# Patient Record
Sex: FEMALE | Race: WHITE | NOT HISPANIC OR LATINO | Employment: STUDENT | URBAN - METROPOLITAN AREA
[De-identification: names, ages, dates, MRNs, and addresses within clinical notes are randomized per-mention and may not be internally consistent; named-entity substitution may affect disease eponyms.]

---
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
## 2024-02-19 ENCOUNTER — CLINICAL SUPPORT (OUTPATIENT)
Dept: GYNECOLOGY | Facility: CLINIC | Age: 19
End: 2024-02-19
Payer: COMMERCIAL

## 2024-02-19 VITALS — DIASTOLIC BLOOD PRESSURE: 82 MMHG | BODY MASS INDEX: 27.36 KG/M2 | SYSTOLIC BLOOD PRESSURE: 112 MMHG | WEIGHT: 152 LBS

## 2024-02-19 DIAGNOSIS — Z30.42 DEPOT CONTRACEPTION: Primary | ICD-10-CM

## 2024-02-19 PROCEDURE — 96372 THER/PROPH/DIAG INJ SC/IM: CPT

## 2024-02-19 RX ADMIN — MEDROXYPROGESTERONE ACETATE 150 MG: 150 INJECTION, SUSPENSION INTRAMUSCULAR at 01:20

## 2024-02-19 NOTE — PROGRESS NOTES
Radha Hahn presents for a scheduled medroxyprogesterone 150mg injection. This was administered in the L deltoid without any difficulty.  Pt has the following complaints/concerns none .    A notice was sent to Dr. Riedel to sign off on order.  Pt will schedule next injection between 05/07/24 - 05/21/24.    NDC- 63624-597-46    LOT- [id number]    EXP- 01/31/2026

## 2024-02-20 RX ORDER — MEDROXYPROGESTERONE ACETATE 150 MG/ML
150 INJECTION, SUSPENSION INTRAMUSCULAR ONCE
Status: COMPLETED | OUTPATIENT
Start: 2024-02-20 | End: 2024-02-19

## 2024-03-14 ENCOUNTER — TELEPHONE (OUTPATIENT)
Age: 19
End: 2024-03-14

## 2024-03-14 NOTE — TELEPHONE ENCOUNTER
Called patient and LM encouraging her to keep appt. Patient is overdue and was no show for last appointment. She has not been seen for yearly since 2022 and keeps getting DEPO. Need appointment, can be with any provider.

## 2024-03-14 NOTE — TELEPHONE ENCOUNTER
Pt needs to reschedule appt, cannot make it at 8 AM on 3/15. Looking to see Dr. Danielson only, however Dr. Danielson is booked through Mid-August. Offered 3/15 at 3:30 PM, declined due to work. Contact number on file or Father's mobile number on file to reschedule.

## 2024-03-15 ENCOUNTER — ANNUAL EXAM (OUTPATIENT)
Dept: GYNECOLOGY | Facility: CLINIC | Age: 19
End: 2024-03-15
Payer: COMMERCIAL

## 2024-03-15 VITALS
SYSTOLIC BLOOD PRESSURE: 132 MMHG | DIASTOLIC BLOOD PRESSURE: 74 MMHG | BODY MASS INDEX: 26.68 KG/M2 | WEIGHT: 150.6 LBS | HEIGHT: 63 IN

## 2024-03-15 DIAGNOSIS — Z01.419 ENCOUNTER FOR ANNUAL ROUTINE GYNECOLOGICAL EXAMINATION: Primary | ICD-10-CM

## 2024-03-15 DIAGNOSIS — Z30.016 ENCOUNTER FOR INITIAL PRESCRIPTION OF TRANSDERMAL PATCH HORMONAL CONTRACEPTIVE DEVICE: ICD-10-CM

## 2024-03-15 DIAGNOSIS — Z30.09 GENERAL COUNSELING AND ADVICE ON FEMALE CONTRACEPTION: ICD-10-CM

## 2024-03-15 PROCEDURE — S0612 ANNUAL GYNECOLOGICAL EXAMINA: HCPCS | Performed by: OBSTETRICS & GYNECOLOGY

## 2024-03-15 RX ORDER — NORELGESTROMIN AND ETHINYL ESTRADIOL 35; 150 UG/MG; UG/MG
1 PATCH TRANSDERMAL WEEKLY
Qty: 3 PATCH | Refills: 3 | Status: SHIPPED | OUTPATIENT
Start: 2024-03-15

## 2024-04-13 DIAGNOSIS — Z30.016 ENCOUNTER FOR INITIAL PRESCRIPTION OF TRANSDERMAL PATCH HORMONAL CONTRACEPTIVE DEVICE: ICD-10-CM

## 2024-04-15 RX ORDER — NORELGESTROMIN AND ETHINYL ESTRADIOL 150; 35 UG/D; UG/D
PATCH TRANSDERMAL
Qty: 9 PATCH | Refills: 1 | Status: SHIPPED | OUTPATIENT
Start: 2024-04-15

## 2024-05-21 ENCOUNTER — TELEPHONE (OUTPATIENT)
Age: 19
End: 2024-05-21

## 2024-05-21 NOTE — TELEPHONE ENCOUNTER
"Pt's father (on the communication consent form) called with questions regarding \"a shot\" the pt was receiving prior to beginning the patch. It looks like it may have been the medroxyPROGESTERone (DEPO-PROVERA). Apparently, pt was on shot for 1-2 yrs and her father is asking about long term side effects. States she recently saw a dentist and her teeth appear completely rotted/cracked. Asking if this could be a side effect of her having the shot in the past. He is requesting a acll back. Thank you.  "

## 2024-05-23 NOTE — TELEPHONE ENCOUNTER
Attempted to call patient/patient's father to review response from Dr. Danielson. No answer - call disconnected, unable to leave message.

## 2024-05-24 ENCOUNTER — TELEPHONE (OUTPATIENT)
Age: 19
End: 2024-05-24

## 2024-05-24 ENCOUNTER — DOCUMENTATION (OUTPATIENT)
Dept: GYNECOLOGY | Facility: CLINIC | Age: 19
End: 2024-05-24

## 2024-05-24 NOTE — TELEPHONE ENCOUNTER
Patients father called (on communication consent) concerned stating his daughter was seen by her dentist recently and told that all of her teeth are appearing to be rotted/cracked and he's very concerned that its because of the Depo shot . Patient was on the shot for 1-2 years and her father is thinking this may be the cause of her teeth issues . States patient is no longer on the shot she's now on a patch . Patient was on MedroxylPROGESTERone(Depo-Provera) . Father is asking for a return call to further discuss . Asked that I include other doctors in case they may be of assistance .   Gyno - Airam Danielson [phone]  Dentist - Thalia Mcarthur [phone]

## 2024-05-24 NOTE — TELEPHONE ENCOUNTER
Pt father called regarding pt teeth breaking and becoming brittle. Pt father stated pt dentist is Thalia Mcarthur and can be reach at [phone].

## 2024-05-24 NOTE — PROGRESS NOTES
Spoke with the patient's father over the phone, he is on her HIPAA form.  He has questions about the patient's past use of Depo Provera.  She was on this from January 2023 until March 2024.  She recently went to the dentist and has extensive cavities and will need significant dental work.  He has some concerns about if this was caused by Depo-Provera.    I explained that Depo-Provera can cause a temporary decrease in bone density.  This is typically seen after long-term use although when the Depo-Provera is stopped the decrease in bone density is reversed.  This is not usually an issue in a younger patient.  When her Depo-Provera was stopped, she was then placed on a contraceptive patch which contains estrogen which would help with bone density as well.    I have not seen reports of cavities with Depo Provera.  I do not think this is the cause of her poor dentition.  He asked if I would speak with her dentist.  I called her dentist, Dr Thalia Marinelli ( [phone] ).  They are not in on Friday and I left a message asking her to return my call next week.

## 2024-05-28 ENCOUNTER — DOCUMENTATION (OUTPATIENT)
Dept: GYNECOLOGY | Facility: CLINIC | Age: 19
End: 2024-05-28

## 2024-05-28 NOTE — PROGRESS NOTES
Spoke with the patient's dentist, Dr Marinelli.  I spoke with Radha's father last week because he was concerned about the patient's use of Depo Provera causing severe tooth decay.  I explained that the Depo Provera can cause decrease in bone density but this is typically temporary and reversible.  She was only on Depo-Provera a little longer than a year.  I did tell him that I would speak with her dentist.    I spoke with her dentist, Dr. Marinelli today.  I expect planed the above to her and she does not feel Depo-Provera is the cause.  She feels that part of the cause is the patient's excessive use of sugary drinks, vaping which can dry out the mouth and she also has some concerns about possible drug use.  The patient's father mentioned that she had a positive drug test in the past and the dentist felt that the patient seemed as though she may have been under the influence.  She has concerns about the patient's teeth as she has cavities and almost every tooth.    I reviewed this with her father on the phone today as well.  I am going to message Dr. Gonzalez who is the patient's primary care doctor and see if they can discuss with the patient and order testing as indicated.  The patient's father stated that he reached out to their office last week as well but has not yet heard back.  Apparently, there is a lot of difficulty in the family as the parents are  and there have been many issues.    The patient's father is listed on her HIPAA form.

## 2024-05-31 ENCOUNTER — TELEPHONE (OUTPATIENT)
Dept: FAMILY MEDICINE CLINIC | Facility: CLINIC | Age: 19
End: 2024-05-31

## 2024-05-31 NOTE — TELEPHONE ENCOUNTER
----- Message from Irene Gonzalez MD sent at 5/30/2024  3:24 PM EDT -----  Jose Alegria,  I agree with you I do not think Depo will cause her cavities and decay ,I will be happy to see her and discuss if she is willing to do work up,regarding urine drug screen I will discuss it with her but she is above 18 she has right to declined   I review her chart I did not see phone call from father or patient ,but we will reach out to her   Thank you   Irene Hampton ,would you please call patient and schedule appointment ,  ----- Message -----  From: Airam Danielson DO  Sent: 5/28/2024   8:04 PM EDT  To: MD Jose Casanova,    I need some guidance for this patient.  Her father called last week concerned because she went to the dentist and has cavities and decay in almost all of her teeth.  He was obviously upset over this.  He was wondering if the cause was the depo provera she was on.  She was on this for a little over a year and just stopped it in March.  She is now on the patch.  As you know, Depo can cause a decrease in bone density with long term use but I have not seen tooth decay and I couldn't find that when I looked it up.  I told him this and also spoke with her dentist.  The dentist did not feel it was from Depo Provera.    The dentist has concerns about possible drug use affecting her dentition, she does have a high intake of soda and sugar in her diet and also vapes but the father mentioned a positive drug screen in the past.  I spoke with the father today, he is concerned she could be using drugs but does not know for sure.  I relayed to him that I don't think the Depo is the cause.  He told me he placed a call to your office last week.  Just wanted to touch base with you, not sure what the next step is, if anything.  She is technically an adult but he is on her HIPAA and he knows her history well.    Thanks,  Airam

## 2024-06-05 ENCOUNTER — TELEPHONE (OUTPATIENT)
Age: 19
End: 2024-06-05

## 2024-06-05 NOTE — TELEPHONE ENCOUNTER
Last visit 11/13/2023  Next appt 11/15/2024    Sharad, patient's father, stated that the dentist has concerns over the patient's heavily decaying teeth. The dentist advised patient to get a check up by PCP. Sharad stated if scripts for blood work and urine can be placed to check for any abnormalities. Sharad also mentioned that the patient does vape and drinks a lot of sprite soda.   The dentist Thalia Mcarthur would like to speak with PCP at her earliest convenience. Sharad also mentioned that Dr Danielson is administering patient the depo-provera shot and would this maybe be a cause for concern. Sharad is asking if PCP can have a conference call with both dentist and gyn doctor to discuss this matter. Please contact patient for an early appointment. I checked but there is nothing with Dr Gonzalez until 3 weeks from now. Please advise.             Thalia Mcarthur, DMD  [address] [facility] [address]  Ph: [phone]  Fax: [phone]    Airam Danielson, DO  [facility]  [address] [facility] [address]  Ph: [phone]  Fax: [phone]

## 2024-06-17 ENCOUNTER — OFFICE VISIT (OUTPATIENT)
Dept: FAMILY MEDICINE CLINIC | Facility: CLINIC | Age: 19
End: 2024-06-17
Payer: COMMERCIAL

## 2024-06-17 ENCOUNTER — TELEPHONE (OUTPATIENT)
Dept: FAMILY MEDICINE CLINIC | Facility: CLINIC | Age: 19
End: 2024-06-17

## 2024-06-17 VITALS
TEMPERATURE: 98 F | WEIGHT: 140.4 LBS | BODY MASS INDEX: 24.88 KG/M2 | HEART RATE: 119 BPM | DIASTOLIC BLOOD PRESSURE: 84 MMHG | SYSTOLIC BLOOD PRESSURE: 120 MMHG | OXYGEN SATURATION: 98 % | HEIGHT: 63 IN

## 2024-06-17 DIAGNOSIS — K02.9 TOOTH DECAY: Primary | ICD-10-CM

## 2024-06-17 DIAGNOSIS — F17.200 VAPING NICOTINE DEPENDENCE, NON-TOBACCO PRODUCT: ICD-10-CM

## 2024-06-17 DIAGNOSIS — F33.2 SEVERE EPISODE OF RECURRENT MAJOR DEPRESSIVE DISORDER, WITHOUT PSYCHOTIC FEATURES (HCC): ICD-10-CM

## 2024-06-17 PROCEDURE — 99214 OFFICE O/P EST MOD 30 MIN: CPT | Performed by: FAMILY MEDICINE

## 2024-06-17 NOTE — TELEPHONE ENCOUNTER
Following appointment today with Dr. Gonzalez, patient was supposed to have blood work and follow up appointment. Called and left message for patient to return call to the office to schedul a 1 month follow up with Dr. Gonzalez. Lab orders placed and mailed to patient home.

## 2024-06-20 PROBLEM — K02.9 TOOTH DECAY: Status: ACTIVE | Noted: 2024-06-20

## 2024-06-20 NOTE — ASSESSMENT & PLAN NOTE
New concern patient she has history of smoking and now she is vaping nicotine previously used to be on Depakote shot and recently changed to different contraceptive recommend the patient to do some workup including CBC CMP TSH vitamin D. Phosphorus per patient already had x-ray done for her teeth at the dentist I also recommend urine drug screen and patient declined it  Recommend to keep oral hygiene

## 2024-06-20 NOTE — PROGRESS NOTES
Ambulatory Visit  Name: Radha Hahn      : 2005      MRN: [id number]  Encounter Provider: Irene Gonzalez MD  Encounter Date: 2024   Encounter department: [facility]    Assessment & Plan   1. Tooth decay  Assessment & Plan:  New concern patient she has history of smoking and now she is vaping nicotine previously used to be on Depakote shot and recently changed to different contraceptive recommend the patient to do some workup including CBC CMP TSH vitamin D. Phosphorus per patient already had x-ray done for her teeth at the dentist I also recommend urine drug screen and patient declined it  Recommend to keep oral hygiene   Orders:  -     Vitamin D 25 hydroxy; Future  -     Phosphorus; Future  -     CBC and differential; Future  -     Comprehensive metabolic panel; Future  -     TSH, 3rd generation with Free T4 reflex; Future  -     Vitamin D 25 hydroxy  -     Phosphorus  -     CBC and differential  -     Comprehensive metabolic panel  -     TSH, 3rd generation with Free T4 reflex  2. Vaping nicotine dependence, non-tobacco product  Assessment & Plan:  Chronic aware of complication not ready to stop   3. Severe episode of recurrent major depressive disorder, without psychotic features (HCC)  Assessment & Plan:  Patient currently not on med       Tobacco Cessation Counseling: Tobacco cessation counseling was provided. The patient is sincerely urged to quit consumption of tobacco. She is not ready to quit tobacco.       History of Present Illness     Patient today concerned about to SPK as she was at the dentist and she had multiple decayed no teeth and she is concerned about it as she had history of biting on her nails history of smoking cigarettes and now she doing vaping she had a history of using drugs but per patient she does not do it anymore and she denies any bone pain joint pain no rash no change in the diet no change in the weight         Review of  "Systems   Constitutional:  Negative for activity change, appetite change, fatigue and fever.   HENT:  Positive for dental problem. Negative for congestion, ear pain, sinus pressure, sinus pain and sore throat.    Eyes:  Negative for pain, discharge, redness and itching.   Respiratory:  Negative for cough, chest tightness, shortness of breath and stridor.    Cardiovascular:  Negative for chest pain, palpitations and leg swelling.   Gastrointestinal:  Negative for abdominal pain, blood in stool, constipation, diarrhea and nausea.   Genitourinary:  Negative for dysuria, flank pain, frequency and hematuria.   Musculoskeletal:  Negative for back pain, joint swelling and neck pain.   Skin:  Negative for pallor and rash.   Neurological:  Negative for dizziness, tremors, weakness, numbness and headaches.   Hematological:  Does not bruise/bleed easily.       Objective     /84 (BP Location: Left arm, Patient Position: Sitting, Cuff Size: Standard)   Pulse (!) 119   Temp 98 °F (36.7 °C) (Tympanic)   Ht 5' 2.5\" (1.588 m)   Wt 63.7 kg (140 lb 6.4 oz)   SpO2 98%   BMI 25.27 kg/m²     Physical Exam  Vitals and nursing note reviewed.   Constitutional:       General: She is not in acute distress.     Appearance: She is well-developed. She is not diaphoretic.   HENT:      Head: Normocephalic.      Right Ear: Tympanic membrane and external ear normal.      Left Ear: Tympanic membrane and external ear normal.      Nose: No rhinorrhea.      Mouth/Throat:      Pharynx: No posterior oropharyngeal erythema.   Eyes:      General:         Right eye: No discharge.         Left eye: No discharge.      Conjunctiva/sclera: Conjunctivae normal.   Neck:      Vascular: No JVD.   Cardiovascular:      Rate and Rhythm: Normal rate and regular rhythm.      Heart sounds: Normal heart sounds. No murmur heard.     No gallop.   Pulmonary:      Effort: Pulmonary effort is normal. No respiratory distress.      Breath sounds: Normal breath sounds. " No stridor. No wheezing or rales.   Chest:      Chest wall: No tenderness.   Abdominal:      General: There is no distension.      Palpations: Abdomen is soft. There is no mass.      Tenderness: There is no abdominal tenderness. There is no rebound.   Musculoskeletal:         General: No tenderness.      Cervical back: Normal range of motion and neck supple.   Lymphadenopathy:      Cervical: No cervical adenopathy.   Skin:     General: Skin is warm.      Findings: No erythema or rash.   Neurological:      Mental Status: She is alert and oriented to person, place, and time.       Administrative Statements

## 2024-06-27 ENCOUNTER — TELEPHONE (OUTPATIENT)
Age: 19
End: 2024-06-27

## 2024-06-27 NOTE — TELEPHONE ENCOUNTER
Dr. Thalia Mcarthur called and stated she is the patients dentist. Thalia had stated patients father would like her to speak to patients pcp in regards to some concerns the patients father has. Thalia did not disclose these concerns. Please have pcp return call when she is back in the office. Thalia stated she can be reached anytime mid morning. [phone]

## 2024-07-19 ENCOUNTER — OFFICE VISIT (OUTPATIENT)
Dept: GYNECOLOGY | Facility: CLINIC | Age: 19
End: 2024-07-19
Payer: COMMERCIAL

## 2024-07-19 VITALS — WEIGHT: 137 LBS | BODY MASS INDEX: 24.66 KG/M2 | SYSTOLIC BLOOD PRESSURE: 118 MMHG | DIASTOLIC BLOOD PRESSURE: 64 MMHG

## 2024-07-19 DIAGNOSIS — Z30.016 ENCOUNTER FOR INITIAL PRESCRIPTION OF TRANSDERMAL PATCH HORMONAL CONTRACEPTIVE DEVICE: ICD-10-CM

## 2024-07-19 DIAGNOSIS — Z30.45 ENCOUNTER FOR SURVEILLANCE OF TRANSDERMAL PATCH HORMONAL CONTRACEPTIVE DEVICE: Primary | ICD-10-CM

## 2024-07-19 PROCEDURE — 99213 OFFICE O/P EST LOW 20 MIN: CPT | Performed by: OBSTETRICS & GYNECOLOGY

## 2024-07-19 RX ORDER — NORELGESTROMIN AND ETHINYL ESTRADIOL 35; 150 UG/MG; UG/MG
1 PATCH TRANSDERMAL WEEKLY
Qty: 9 PATCH | Refills: 4 | Status: SHIPPED | OUTPATIENT
Start: 2024-07-19

## 2024-07-19 NOTE — PROGRESS NOTES
Assessment/Plan:     Contraception - she is happy with the patch.  She is using it continuously, having a withdrawal bleed every 2 months.  I reviewed this with her along with compliance and recommendation to also use condoms.  RX sent for her and she will return in March for her yearly or sooner if needed     We discussed the concerns with her teeth.  She has been brushing and flossing more regularly and taking some supplements and states that there has been an improvement and she has a follow up with the dentist soon.    I explained that her father was concerned that she had been using drugs.  She admits to some marijuana use but nothing else.  I urged her to take care of herself and ask for help if needed.     There are no diagnoses linked to this encounter.      Subjective:     Patient ID: Radha Hahn is a 18 y.o. female.    Pt here for follow up of her birth control.  She was switched to the contraceptive patch in March at her yearly.  She had been on Depo for almost a year but was concerned this could be affecting her teeth.  I do not believe Depo provera has an affect on dentition but can affect bone density.  She was switch to the patch because she was on pills in the past but had difficulty remembering to take a pill everyday.      Review of Systems   Constitutional: Negative.    Gastrointestinal: Negative.    Genitourinary: Negative.          Objective:     Physical Exam

## 2024-09-10 ENCOUNTER — TELEPHONE (OUTPATIENT)
Dept: FAMILY MEDICINE CLINIC | Facility: CLINIC | Age: 19
End: 2024-09-10

## 2024-09-10 NOTE — TELEPHONE ENCOUNTER
Spoke to Deepali at Dental office regarding form we received. We did see patient in June and are awaiting lab results to complete form.

## 2024-09-18 ENCOUNTER — OFFICE VISIT (OUTPATIENT)
Dept: FAMILY MEDICINE CLINIC | Facility: CLINIC | Age: 19
End: 2024-09-18
Payer: COMMERCIAL

## 2024-09-18 VITALS
WEIGHT: 139 LBS | HEART RATE: 102 BPM | SYSTOLIC BLOOD PRESSURE: 120 MMHG | BODY MASS INDEX: 24.63 KG/M2 | OXYGEN SATURATION: 98 % | DIASTOLIC BLOOD PRESSURE: 80 MMHG | HEIGHT: 63 IN | TEMPERATURE: 99.4 F

## 2024-09-18 DIAGNOSIS — K02.9 TOOTH DECAY: ICD-10-CM

## 2024-09-18 DIAGNOSIS — Z01.818 PRE-OP EXAMINATION: Primary | ICD-10-CM

## 2024-09-18 DIAGNOSIS — J06.9 VIRAL UPPER RESPIRATORY TRACT INFECTION: ICD-10-CM

## 2024-09-18 PROCEDURE — 99214 OFFICE O/P EST MOD 30 MIN: CPT | Performed by: FAMILY MEDICINE

## 2024-09-18 RX ORDER — BENZONATATE 100 MG/1
100 CAPSULE ORAL 3 TIMES DAILY PRN
Qty: 20 CAPSULE | Refills: 0 | Status: SHIPPED | OUTPATIENT
Start: 2024-09-18

## 2024-09-20 ENCOUNTER — APPOINTMENT (OUTPATIENT)
Dept: LAB | Facility: CLINIC | Age: 19
End: 2024-09-20
Payer: COMMERCIAL

## 2024-09-20 DIAGNOSIS — Z01.818 PRE-OP EXAMINATION: ICD-10-CM

## 2024-09-20 LAB
25(OH)D3 SERPL-MCNC: 62.9 NG/ML (ref 30–100)
ALBUMIN SERPL BCG-MCNC: 4.1 G/DL (ref 3.5–5)
ALP SERPL-CCNC: 42 U/L (ref 34–104)
ALT SERPL W P-5'-P-CCNC: 13 U/L (ref 7–52)
ANION GAP SERPL CALCULATED.3IONS-SCNC: 7 MMOL/L (ref 4–13)
AST SERPL W P-5'-P-CCNC: 17 U/L (ref 13–39)
BASOPHILS # BLD AUTO: 0.03 THOUSANDS/ΜL (ref 0–0.1)
BASOPHILS NFR BLD AUTO: 1 % (ref 0–1)
BILIRUB SERPL-MCNC: 0.34 MG/DL (ref 0.2–1)
BUN SERPL-MCNC: 9 MG/DL (ref 5–25)
CALCIUM SERPL-MCNC: 9.3 MG/DL (ref 8.4–10.2)
CHLORIDE SERPL-SCNC: 103 MMOL/L (ref 96–108)
CO2 SERPL-SCNC: 27 MMOL/L (ref 21–32)
CREAT SERPL-MCNC: 0.62 MG/DL (ref 0.6–1.3)
EOSINOPHIL # BLD AUTO: 0.11 THOUSAND/ΜL (ref 0–0.61)
EOSINOPHIL NFR BLD AUTO: 2 % (ref 0–6)
ERYTHROCYTE [DISTWIDTH] IN BLOOD BY AUTOMATED COUNT: 12.1 % (ref 11.6–15.1)
GFR SERPL CREATININE-BSD FRML MDRD: 131 ML/MIN/1.73SQ M
GLUCOSE P FAST SERPL-MCNC: 96 MG/DL (ref 65–99)
HCT VFR BLD AUTO: 40.3 % (ref 34.8–46.1)
HGB BLD-MCNC: 13.6 G/DL (ref 11.5–15.4)
IMM GRANULOCYTES # BLD AUTO: 0.01 THOUSAND/UL (ref 0–0.2)
IMM GRANULOCYTES NFR BLD AUTO: 0 % (ref 0–2)
INR PPP: 1.03 (ref 0.85–1.19)
LYMPHOCYTES # BLD AUTO: 2.24 THOUSANDS/ΜL (ref 0.6–4.47)
LYMPHOCYTES NFR BLD AUTO: 44 % (ref 14–44)
MCH RBC QN AUTO: 28.8 PG (ref 26.8–34.3)
MCHC RBC AUTO-ENTMCNC: 33.7 G/DL (ref 31.4–37.4)
MCV RBC AUTO: 85 FL (ref 82–98)
MONOCYTES # BLD AUTO: 0.31 THOUSAND/ΜL (ref 0.17–1.22)
MONOCYTES NFR BLD AUTO: 6 % (ref 4–12)
NEUTROPHILS # BLD AUTO: 2.4 THOUSANDS/ΜL (ref 1.85–7.62)
NEUTS SEG NFR BLD AUTO: 47 % (ref 43–75)
NRBC BLD AUTO-RTO: 0 /100 WBCS
PHOSPHATE SERPL-MCNC: 3.5 MG/DL (ref 2.7–4.5)
PLATELET # BLD AUTO: 276 THOUSANDS/UL (ref 149–390)
PMV BLD AUTO: 10.6 FL (ref 8.9–12.7)
POTASSIUM SERPL-SCNC: 3.2 MMOL/L (ref 3.5–5.3)
PROT SERPL-MCNC: 7.4 G/DL (ref 6.4–8.4)
PROTHROMBIN TIME: 13.8 SECONDS (ref 12.3–15)
RBC # BLD AUTO: 4.72 MILLION/UL (ref 3.81–5.12)
SODIUM SERPL-SCNC: 137 MMOL/L (ref 135–147)
TSH SERPL DL<=0.05 MIU/L-ACNC: 1.83 UIU/ML (ref 0.45–4.5)
WBC # BLD AUTO: 5.1 THOUSAND/UL (ref 4.31–10.16)

## 2024-09-20 PROCEDURE — 82306 VITAMIN D 25 HYDROXY: CPT

## 2024-09-20 PROCEDURE — 84443 ASSAY THYROID STIM HORMONE: CPT

## 2024-09-20 PROCEDURE — 80053 COMPREHEN METABOLIC PANEL: CPT

## 2024-09-20 PROCEDURE — 85610 PROTHROMBIN TIME: CPT

## 2024-09-20 PROCEDURE — 84100 ASSAY OF PHOSPHORUS: CPT

## 2024-09-20 PROCEDURE — 85025 COMPLETE CBC W/AUTO DIFF WBC: CPT

## 2024-09-20 PROCEDURE — 36415 COLL VENOUS BLD VENIPUNCTURE: CPT

## 2024-09-22 PROBLEM — Z01.818 PRE-OP EXAMINATION: Status: ACTIVE | Noted: 2024-09-22

## 2024-09-22 PROBLEM — J06.9 VIRAL UPPER RESPIRATORY TRACT INFECTION: Status: ACTIVE | Noted: 2024-09-22

## 2024-09-22 PROBLEM — Z01.818 PRE-OPERATIVE CLEARANCE: Status: RESOLVED | Noted: 2021-10-06 | Resolved: 2024-09-22

## 2024-09-22 NOTE — ASSESSMENT & PLAN NOTE
Acute symptomatic discussed with the patient 90% of upper respiratory symptoms caused by viral no indication for antibiotics symptom has been going on for 3 days patient declined rapid test for flu or COVID in the office  patient afebrile oxygen level is normal on room air recommend supportive treatment with Tessalon Perles for the cough if no improvement to call the office    Orders:    benzonatate (TESSALON PERLES) 100 mg capsule; Take 1 capsule (100 mg total) by mouth 3 (three) times a day as needed for cough

## 2024-09-22 NOTE — PROGRESS NOTES
Ambulatory Visit  Name: Radha Hahn      : 2005      MRN: [id number]  Encounter Provider: Irene Gonzalez MD  Encounter Date: 2024   Encounter department: [facility]      Assessment & Plan  Pre-op examination  Patient here for preop clearance for dental work and possible tooth extraction patient had tooth decay and extensive multiple teeth in her mouth   Previously was evaluated in 2024 and was a concern about multiple tooth decay and younger age and we recommend the patient to do some more workup including check of vitamin D first for kidney calcium is not done yet discussed the patient important doing the blood work before clearance and we will add PT/INR    Orders:    Protime-INR; Future    Viral upper respiratory tract infection  Acute symptomatic discussed with the patient 90% of upper respiratory symptoms caused by viral no indication for antibiotics symptom has been going on for 3 days patient declined rapid test for flu or COVID in the office  patient afebrile oxygen level is normal on room air recommend supportive treatment with Tessalon Perles for the cough if no improvement to call the office    Orders:    benzonatate (TESSALON PERLES) 100 mg capsule; Take 1 capsule (100 mg total) by mouth 3 (three) times a day as needed for cough    Tooth decay  Chronic patient follow-up with the dentist who currently scheduled to have procedure on her teeth including possible extraction previously recommend the patient to do blood work to check her vitamin D.  Will and TSH calcium was not done yet recommend to do blood work as ordered              History of Present Illness     Patient here for preop clearance she is scheduled to have a dental work for multiple tooth decay and possible extraction patient was evaluated previously in 2020 for recommendation to do some blood work secondary to having multiple tooth decay and extensive and young age discussion  "the case was with her previous dentist and with OB/GYN as there was conc from patient about possible help to decay from the Depakote we ordered blood work CBC CMP TSH vitamin D and was not done yet patient today concerned about cough it has been going on for 3 days productive green-colored sputum no short of breath no chest pain no dyspnea on exertion no hematosis no lower extremity edema patient has history of vaping      Review of Systems   Constitutional:  Negative for chills and fever.   HENT:  Negative for ear pain and sore throat.    Eyes:  Negative for pain and visual disturbance.   Respiratory:  Positive for cough. Negative for shortness of breath.    Cardiovascular:  Negative for chest pain and palpitations.   Gastrointestinal:  Negative for abdominal pain, constipation, diarrhea and vomiting.   Genitourinary:  Negative for dysuria and hematuria.   Musculoskeletal:  Negative for arthralgias and back pain.   Skin:  Negative for color change and rash.   Neurological:  Negative for seizures and syncope.   All other systems reviewed and are negative.    Objective     /80 (BP Location: Left arm, Patient Position: Sitting)   Pulse 102   Temp 99.4 °F (37.4 °C) (Tympanic)   Ht 5' 3\" (1.6 m)   Wt 63 kg (139 lb)   LMP 08/28/2024 (Approximate)   SpO2 98%   Breastfeeding No   BMI 24.62 kg/m²     Physical Exam  Vitals and nursing note reviewed.   Constitutional:       General: She is not in acute distress.     Appearance: She is well-developed. She is not diaphoretic.   HENT:      Head: Normocephalic.      Right Ear: Tympanic membrane and external ear normal.      Left Ear: Tympanic membrane and external ear normal.      Nose: No rhinorrhea.      Mouth/Throat:      Pharynx: No posterior oropharyngeal erythema.   Eyes:      General:         Right eye: No discharge.         Left eye: No discharge.      Conjunctiva/sclera: Conjunctivae normal.   Neck:      Vascular: No JVD.   Cardiovascular:      Rate and " Rhythm: Normal rate and regular rhythm.      Heart sounds: Normal heart sounds. No murmur heard.     No gallop.   Pulmonary:      Effort: Pulmonary effort is normal. No respiratory distress.      Breath sounds: Normal breath sounds. No wheezing.   Abdominal:      General: There is no distension.      Palpations: Abdomen is soft.      Tenderness: There is no abdominal tenderness. There is no rebound.   Musculoskeletal:         General: No tenderness.      Cervical back: Normal range of motion and neck supple.   Lymphadenopathy:      Cervical: No cervical adenopathy.   Skin:     General: Skin is warm.      Findings: No rash.   Neurological:      Mental Status: She is alert and oriented to person, place, and time.         Irene Gonzalez MD

## 2024-09-22 NOTE — ASSESSMENT & PLAN NOTE
Patient here for preop clearance for dental work and possible tooth extraction patient had tooth decay and extensive multiple teeth in her mouth   Previously was evaluated in June 2024 and was a concern about multiple tooth decay and younger age and we recommend the patient to do some more workup including check of vitamin D first for kidney calcium is not done yet discussed the patient important doing the blood work before clearance and we will add PT/INR    Orders:    Protime-INR; Future

## 2024-09-22 NOTE — ASSESSMENT & PLAN NOTE
Chronic patient follow-up with the dentist who currently scheduled to have procedure on her teeth including possible extraction previously recommend the patient to do blood work to check her vitamin D.  Will and TSH calcium was not done yet recommend to do blood work as ordered

## 2024-10-02 ENCOUNTER — TELEPHONE (OUTPATIENT)
Age: 19
End: 2024-10-02

## 2024-10-02 NOTE — TELEPHONE ENCOUNTER
Patients father called stating that he spoke to Ana and [facility] today and she stated they never received the pre op paperwork that is needed . Please re fax pre op paperwork as soon as possible to [phone] .

## 2024-10-22 PROBLEM — J06.9 VIRAL UPPER RESPIRATORY TRACT INFECTION: Status: RESOLVED | Noted: 2024-09-22 | Resolved: 2024-10-22

## 2024-12-26 ENCOUNTER — OFFICE VISIT (OUTPATIENT)
Dept: URGENT CARE | Age: 19
End: 2024-12-26
Payer: COMMERCIAL

## 2024-12-26 VITALS
OXYGEN SATURATION: 98 % | HEIGHT: 63 IN | TEMPERATURE: 101 F | RESPIRATION RATE: 18 BRPM | BODY MASS INDEX: 24.13 KG/M2 | WEIGHT: 136.2 LBS | HEART RATE: 110 BPM | SYSTOLIC BLOOD PRESSURE: 106 MMHG | DIASTOLIC BLOOD PRESSURE: 52 MMHG

## 2024-12-26 DIAGNOSIS — B34.9 ACUTE VIRAL SYNDROME: Primary | ICD-10-CM

## 2024-12-26 DIAGNOSIS — R50.9 FEVER, UNSPECIFIED FEVER CAUSE: ICD-10-CM

## 2024-12-26 DIAGNOSIS — R05.1 ACUTE COUGH: ICD-10-CM

## 2024-12-26 PROCEDURE — G0382 LEV 3 HOSP TYPE B ED VISIT: HCPCS | Performed by: EMERGENCY MEDICINE

## 2024-12-26 PROCEDURE — S9083 URGENT CARE CENTER GLOBAL: HCPCS | Performed by: EMERGENCY MEDICINE

## 2024-12-26 RX ORDER — IBUPROFEN 600 MG/1
600 TABLET, FILM COATED ORAL ONCE
Status: COMPLETED | OUTPATIENT
Start: 2024-12-26 | End: 2024-12-26

## 2024-12-26 RX ORDER — BROMPHENIRAMINE MALEATE, PSEUDOEPHEDRINE HYDROCHLORIDE, AND DEXTROMETHORPHAN HYDROBROMIDE 2; 30; 10 MG/5ML; MG/5ML; MG/5ML
5 SYRUP ORAL 4 TIMES DAILY PRN
Qty: 120 ML | Refills: 0 | Status: SHIPPED | OUTPATIENT
Start: 2024-12-26

## 2024-12-26 RX ADMIN — IBUPROFEN 600 MG: 600 TABLET, FILM COATED ORAL at 13:26

## 2024-12-26 NOTE — PROGRESS NOTES
[facility]        NAME: Radha Hahn is a 19 y.o. female  : 2005    MRN: [id number]  DATE: 2024  TIME: 1:31 PM    Assessment and Plan   Acute viral syndrome [B34.9]  1. Acute viral syndrome        2. Fever, unspecified fever cause  ibuprofen (MOTRIN) tablet 600 mg      3. Acute cough  brompheniramine-pseudoephedrine-DM 30-2-10 MG/5ML syrup          Patient declined PCR covid/flu at this time.    Patient Instructions     Start Bromfed as prescribed  Vitamin D3 2000 IU daily  Vitamin C 1000mg twice per day  Multivitamin daily  Fluids and rest  Over the counter cold medication as needed (EX: tylenol/motrin)  Follow up with PCP in 3-5 days.  Proceed to  ER if symptoms worsen.    If tests are performed, our office will contact you with results only if changes need to made to the care plan discussed with you at the visit. [facility]    Chief Complaint     Chief Complaint   Patient presents with    Fever    Dizziness    Cold Like Symptoms     C/o symptoms starting yesterday, has been around others that have been sick.          History of Present Illness       Patient is a 18 yo female with significant PMH of smoking presenting in the clinic today for cold sx which began yesterday. Admits fever, cough, body aches, fatigue, congestion, rhinorrhea, ear pain, sore throat, headache, intermittent dizziness, and SOB with exertion. Denies chest pain, abdominal pain, n/v/d. Admits the use of tylenol and DayQuil for sx management. Positive recent sick contacts at home.        Review of Systems   Review of Systems   Constitutional:  Positive for fatigue and fever. Negative for chills.   HENT:  Positive for congestion, ear pain, rhinorrhea and sore throat. Negative for postnasal drip, sinus pressure and sinus pain.    Eyes:  Negative for visual disturbance.   Respiratory:  Positive for cough and shortness of breath (with exertion).    Cardiovascular:   Negative for chest pain.   Gastrointestinal:  Negative for abdominal pain, diarrhea, nausea and vomiting.   Musculoskeletal:  Positive for myalgias.   Skin:  Negative for rash.   Neurological:  Positive for dizziness and headaches.         Current Medications       Current Outpatient Medications:     brompheniramine-pseudoephedrine-DM 30-2-10 MG/5ML syrup, Take 5 mL by mouth 4 (four) times a day as needed for allergies, cough or congestion, Disp: 120 mL, Rfl: 0    benzonatate (TESSALON PERLES) 100 mg capsule, Take 1 capsule (100 mg total) by mouth 3 (three) times a day as needed for cough, Disp: 20 capsule, Rfl: 0    multivitamin (THERAGRAN) TABS, Take 1 tablet by mouth daily, Disp: , Rfl:     norelgestromin-ethinyl estradiol (Xulane) 150-35 MCG/24HR, Place 1 patch on the skin over 7 days once a week continuously, without a placebo week., Disp: 9 patch, Rfl: 4  No current facility-administered medications for this visit.    Current Allergies     Allergies as of 12/26/2024 - Reviewed 12/26/2024   Allergen Reaction Noted    Other Allergic Rhinitis 04/29/2020            The following portions of the patient's history were reviewed and updated as appropriate: allergies, current medications, past family history, past medical history, past social history, past surgical history and problem list.     Past Medical History:   Diagnosis Date    Addiction to drug ([facility])     ADHD (attention deficit hyperactivity disorder)     Anxiety     Bipolar disorder ([facility])     Borderline personality disorder ([facility])     Depression     Impulse control disorder     PTSD (post-traumatic stress disorder)     Schizoaffective disorder ([facility])     Schizophrenia ([facility])     Self-injurious behavior     Sleep difficulties     Substance abuse ([facility])     Withdrawal symptoms, drug or narcotic ([facility])        Past Surgical History:   Procedure Laterality Date    APPENDECTOMY      ME EXCISION NAIL MATRIX PERMANENT REMOVAL Right 10/15/2021    Procedure: RIGHT HALLUX  "NAIL AVULSION;  Surgeon: Jamison Blanco DPM;  Location:  MAIN OR;  Service: Podiatry       Family History   Problem Relation Age of Onset    Hypertension Mother     Depression Mother     Ovarian cancer Mother     Hypertension Father     Hyperlipidemia Father     Heart disease Maternal Grandmother     Depression Maternal Grandmother     Alcohol abuse Maternal Grandmother     Drug abuse Maternal Grandmother     Depression Maternal Grandfather     Completed Suicide  Maternal Grandfather     Emphysema Paternal Grandmother     Stomach cancer Paternal Grandfather     Substance Abuse Neg Hx          Medications have been verified.        Objective   /52   Pulse (!) 110   Temp (!) 101 °F (38.3 °C) (Tympanic)   Resp 18   Ht 5' 3\" (1.6 m)   Wt 61.8 kg (136 lb 3.2 oz)   SpO2 98%   BMI 24.13 kg/m²        Physical Exam     Physical Exam  Vitals reviewed.   Constitutional:       General: She is not in acute distress.     Appearance: Normal appearance. She is normal weight. She is ill-appearing.   HENT:      Head: Normocephalic.      Right Ear: Hearing, tympanic membrane, ear canal and external ear normal. No middle ear effusion. There is no impacted cerumen. Tympanic membrane is not erythematous or bulging.      Left Ear: Hearing, tympanic membrane, ear canal and external ear normal.  No middle ear effusion. There is no impacted cerumen. Tympanic membrane is not erythematous or bulging.      Nose: Congestion present. No rhinorrhea.      Right Sinus: No maxillary sinus tenderness or frontal sinus tenderness.      Left Sinus: No maxillary sinus tenderness or frontal sinus tenderness.      Mouth/Throat:      Lips: Pink.      Mouth: Mucous membranes are moist.      Pharynx: Oropharynx is clear. Uvula midline. Posterior oropharyngeal erythema (mild) and postnasal drip present. No pharyngeal swelling, oropharyngeal exudate or uvula swelling.      Tonsils: No tonsillar exudate or tonsillar abscesses. 1+ on the right. 1+ " on the left.   Eyes:      General:         Right eye: No discharge.         Left eye: No discharge.      Conjunctiva/sclera: Conjunctivae normal.   Cardiovascular:      Rate and Rhythm: Normal rate and regular rhythm.      Pulses: Normal pulses.      Heart sounds: Normal heart sounds. No murmur heard.     No friction rub. No gallop.   Pulmonary:      Effort: Pulmonary effort is normal. No respiratory distress.      Breath sounds: Normal breath sounds. No stridor. No wheezing, rhonchi or rales.   Musculoskeletal:      Cervical back: Normal range of motion and neck supple. No tenderness.   Lymphadenopathy:      Cervical: No cervical adenopathy.   Skin:     General: Skin is warm.      Findings: No rash.   Neurological:      Mental Status: She is alert.   Psychiatric:         Mood and Affect: Mood normal.         Behavior: Behavior normal.

## 2024-12-26 NOTE — LETTER
December 26, 2024     Patient: Radha Hahn   YOB: 2005   Date of Visit: 12/26/2024       To Whom it May Concern:    Radha Hahn was seen in my clinic on 12/26/2024. She may return to work on 12/29/2024 and when fever free for 24 hours without the use of a fever-reducing agent .    If you have any questions or concerns, please don't hesitate to call.         Sincerely,          Robyn Sharpe PA-C        CC: No Recipients   Time (Minutes - Will Only Render If Nonzero): 45

## 2025-01-06 ENCOUNTER — TELEPHONE (OUTPATIENT)
Age: 20
End: 2025-01-06

## 2025-01-06 DIAGNOSIS — Z11.1 SCREENING FOR TUBERCULOSIS: Primary | ICD-10-CM

## 2025-01-06 NOTE — TELEPHONE ENCOUNTER
I spoke with pt and let her know that Tb blood test is in the system, to do at any [facility]

## 2025-01-06 NOTE — TELEPHONE ENCOUNTER
Patient called requesting TB vaccine(s) for the following reason(s): Job Requirement .     No order in system. Please advise and/or schedule accordingly.

## 2025-01-08 ENCOUNTER — APPOINTMENT (OUTPATIENT)
Dept: LAB | Facility: CLINIC | Age: 20
End: 2025-01-08
Payer: COMMERCIAL

## 2025-01-08 DIAGNOSIS — Z11.1 SCREENING FOR TUBERCULOSIS: ICD-10-CM

## 2025-01-08 PROCEDURE — 36415 COLL VENOUS BLD VENIPUNCTURE: CPT

## 2025-01-08 PROCEDURE — 86480 TB TEST CELL IMMUN MEASURE: CPT

## 2025-01-09 LAB
GAMMA INTERFERON BACKGROUND BLD IA-ACNC: 0.02 IU/ML
M TB IFN-G BLD-IMP: NEGATIVE
M TB IFN-G CD4+ BCKGRND COR BLD-ACNC: -0.01 IU/ML
M TB IFN-G CD4+ BCKGRND COR BLD-ACNC: -0.02 IU/ML
MITOGEN IGNF BCKGRD COR BLD-ACNC: 9.98 IU/ML

## 2025-01-10 ENCOUNTER — TELEPHONE (OUTPATIENT)
Age: 20
End: 2025-01-10

## 2025-01-10 NOTE — TELEPHONE ENCOUNTER
Patient father called in inquiring about insurance he will call back on Monday with updated insurance

## 2025-01-14 ENCOUNTER — TELEPHONE (OUTPATIENT)
Age: 20
End: 2025-01-14

## 2025-01-14 NOTE — TELEPHONE ENCOUNTER
Sharad the patient's father was calling about the insurance card being rejected. All the information is correct, but is not going thru. I informed patient to give the insurance company a call.

## 2025-03-13 ENCOUNTER — ANNUAL EXAM (OUTPATIENT)
Dept: GYNECOLOGY | Facility: CLINIC | Age: 20
End: 2025-03-13
Payer: COMMERCIAL

## 2025-03-13 VITALS
WEIGHT: 130 LBS | BODY MASS INDEX: 23.04 KG/M2 | HEIGHT: 63 IN | SYSTOLIC BLOOD PRESSURE: 116 MMHG | DIASTOLIC BLOOD PRESSURE: 70 MMHG

## 2025-03-13 DIAGNOSIS — Z30.016 ENCOUNTER FOR INITIAL PRESCRIPTION OF TRANSDERMAL PATCH HORMONAL CONTRACEPTIVE DEVICE: ICD-10-CM

## 2025-03-13 DIAGNOSIS — Z11.3 SCREENING FOR STD (SEXUALLY TRANSMITTED DISEASE): ICD-10-CM

## 2025-03-13 DIAGNOSIS — Z01.419 ENCOUNTER FOR ANNUAL ROUTINE GYNECOLOGICAL EXAMINATION: Primary | ICD-10-CM

## 2025-03-13 PROCEDURE — 87591 N.GONORRHOEAE DNA AMP PROB: CPT | Performed by: OBSTETRICS & GYNECOLOGY

## 2025-03-13 PROCEDURE — S0612 ANNUAL GYNECOLOGICAL EXAMINA: HCPCS | Performed by: OBSTETRICS & GYNECOLOGY

## 2025-03-13 PROCEDURE — 87491 CHLMYD TRACH DNA AMP PROBE: CPT | Performed by: OBSTETRICS & GYNECOLOGY

## 2025-03-13 RX ORDER — NORELGESTROMIN AND ETHINYL ESTRADIOL 35; 150 UG/MG; UG/MG
1 PATCH TRANSDERMAL WEEKLY
Qty: 9 PATCH | Refills: 5 | Status: SHIPPED | OUTPATIENT
Start: 2025-03-13

## 2025-03-13 NOTE — PROGRESS NOTES
Name: Radha Hahn      : 2005      MRN: [id number]  Encounter Provider: Airam Danielson DO  Encounter Date: 3/13/2025   Encounter department: [facility]  :  Assessment & Plan  Encounter for annual routine gynecological examination         Encounter for initial prescription of transdermal patch hormonal contraceptive device    Orders:    norelgestromin-ethinyl estradiol (Xulane) 150-35 MCG/24HR; Place 1 patch on the skin over 7 days once a week continuously, without a placebo week.    Chlamydia/GC amplified DNA by PCR    Screening for STD (sexually transmitted disease)    Orders:    Chlamydia/GC amplified DNA by PCR    She does not require a Pap, will do next year    Chlamydia and gonorrhea done today     Discussed self breast exams    Contraception-she is happy with the contraceptive patch.  She has been having breakthrough bleeding for the past few months and she has been extending the time between withdrawal bleeds.  I recommend that she do a patch free week every fourth week as recommended and she can see if this resolves the breakthrough bleeding.  If it does, she can slowly go back to using the patch continuously.  If it does not help, she will call the office.  We discussed other options such as a birth control pill however she does not want a daily option.  We briefly discussed the vaginal ring but she does not seem interested in this at this time.  She used Depo-Provera before and her father had concerns that it could affect her dentition.    discussed preventive care, regular exercise and a healthy diet      History of Present Illness   HPI  Radha Hahn is a 19 y.o. female who presents for yearly.  She is on the contraceptive patch.  She is doing well although for the past few months, she has been having more breakthrough bleeding.  She is using the patch continuously.  She does not miss any and she always applies a new patch on time.  She is sexually active and is  "not using condoms.    No other side effects.  Weight and blood pressure are good.          Review of Systems   Constitutional: Negative.    Gastrointestinal: Negative.    Genitourinary:  Positive for menstrual problem.          Objective   /70 (BP Location: Left arm, Patient Position: Sitting, Cuff Size: Standard)   Ht 5' 3\" (1.6 m)   Wt 59 kg (130 lb)   BMI 23.03 kg/m²      Physical Exam  Vitals and nursing note reviewed. Exam conducted with a chaperone present.   Constitutional:       Appearance: She is well-developed.   HENT:      Head: Normocephalic and atraumatic.   Neck:      Thyroid: No thyromegaly.   Cardiovascular:      Rate and Rhythm: Normal rate and regular rhythm.   Pulmonary:      Effort: Pulmonary effort is normal.      Breath sounds: Normal breath sounds.   Chest:   Breasts:     Right: Normal.      Left: Normal.      Comments: Examined seated and supine  Abdominal:      Palpations: Abdomen is soft.   Genitourinary:     General: Normal vulva.      Vagina: Normal.      Cervix: Normal.      Uterus: Normal.       Adnexa: Right adnexa normal and left adnexa normal.      Comments: No bleeding noted  Normal pelvic exam  Musculoskeletal:      Cervical back: Neck supple.   Skin:     General: Skin is warm and dry.   Neurological:      Mental Status: She is alert.   Psychiatric:         Mood and Affect: Mood normal.           "

## 2025-03-17 ENCOUNTER — OFFICE VISIT (OUTPATIENT)
Dept: FAMILY MEDICINE CLINIC | Facility: CLINIC | Age: 20
End: 2025-03-17
Payer: COMMERCIAL

## 2025-03-17 ENCOUNTER — RESULTS FOLLOW-UP (OUTPATIENT)
Dept: GYNECOLOGY | Facility: CLINIC | Age: 20
End: 2025-03-17

## 2025-03-17 VITALS
DIASTOLIC BLOOD PRESSURE: 70 MMHG | HEIGHT: 62 IN | HEART RATE: 108 BPM | TEMPERATURE: 99.1 F | WEIGHT: 135 LBS | BODY MASS INDEX: 24.84 KG/M2 | OXYGEN SATURATION: 99 % | SYSTOLIC BLOOD PRESSURE: 120 MMHG

## 2025-03-17 DIAGNOSIS — R09.81 NASAL CONGESTION: Primary | ICD-10-CM

## 2025-03-17 PROBLEM — Z01.818 PRE-OP EXAMINATION: Status: RESOLVED | Noted: 2024-09-22 | Resolved: 2025-03-17

## 2025-03-17 PROBLEM — F33.2 SEVERE EPISODE OF RECURRENT MAJOR DEPRESSIVE DISORDER, WITHOUT PSYCHOTIC FEATURES (HCC): Status: RESOLVED | Noted: 2022-02-21 | Resolved: 2025-03-17

## 2025-03-17 LAB
C TRACH DNA SPEC QL NAA+PROBE: POSITIVE
N GONORRHOEA DNA SPEC QL NAA+PROBE: NEGATIVE

## 2025-03-17 PROCEDURE — 99213 OFFICE O/P EST LOW 20 MIN: CPT

## 2025-03-17 NOTE — PATIENT INSTRUCTIONS
Supportive care:  Stay hydrated, drinking at least 2.5 L (~11-13 cups)  water per day.   Rest.   Ibuprofen (Advil) as needed for pain (use instructions for dosing based on age)   Acetaminophen (Tylenol) as needed for fever/pain (use instructions for dosing based on age).   Hot water/tea with lemon or honey.   Drink source of vitamin C (OJ, oranges, citrus fruits daily).   Can use OTC flonase or mucines for decongestant; Coricidin if you have high blood pressure.   Change toothbrush and clean any mouth appliances after symptoms resolve  Wear a mask in public while symptomatic.   Can return to work/school if 24 hours fever free or on antibiotic AND no longer symptomatic.   If symptoms worsen or persist, call the office to be re-evaluated

## 2025-03-17 NOTE — PROGRESS NOTES
Name: Radha Hahn      : 2005      MRN: [id number]  Encounter Provider: Sunitha Pena PA-C  Encounter Date: 3/17/2025   Encounter department: [facility]  :  Assessment & Plan  Nasal congestion  New diagnosis, acute, symptomatic.  Did not test for flu COVID because would not   Symptoms for 2 to 3 days likely viral  Supportive treatment: Educated patient to stay hydrated, drinking at least 1.5 L water per day. Rest. Ibuprofen (Advil) as needed for pain (use instructions for dosing based on age) Acetaminophen (Tylenol) as needed for fever/pain (use instructions for dosing based on age). Hot water/tea with lemon or honey. Drink source of vitamin C (OJ, oranges, citrus fruits daily). Can use OTC flonase or mucines for decongestant; Coricidin if you have high blood pressure. Wear a mask in public while symptomatic. Can return to work/school if 24 hours fever free or on antibiotic AND no longer symptomatic.   If symptoms worsen or persist, call the office to be re-evaluated    Orders:  •  dextromethorphan-guaifenesin (MUCINEX DM)  MG per 12 hr tablet; Take 1 tablet by mouth every 12 (twelve) hours           History of Present Illness {?Quick Links Encounters * My Last Note * Last Note in Specialty * Snapshot * Since Last Visit * History :04566}  Sinus Problem  This is a new problem. Episode onset: 2 to 3 days. The problem has been gradually worsening since onset. There has been no fever. Associated symptoms include congestion, headaches, sinus pressure, a sore throat and swollen glands. Pertinent negatives include no chills, coughing, diaphoresis, ear pain, neck pain or shortness of breath. Treatments tried: DayQuil. The treatment provided no relief.     Review of Systems   Constitutional:  Negative for activity change, appetite change, chills, diaphoresis, fatigue and fever.   HENT:  Positive for congestion, rhinorrhea, sinus pressure,  "sinus pain and sore throat. Negative for ear pain.    Eyes:  Negative for pain, redness and itching.   Respiratory:  Negative for cough, shortness of breath and wheezing.    Cardiovascular:  Negative for chest pain and palpitations.   Gastrointestinal:  Negative for abdominal pain, constipation, diarrhea, nausea and vomiting.   Genitourinary:  Negative for difficulty urinating, dysuria and hematuria.   Musculoskeletal:  Negative for arthralgias, myalgias, neck pain and neck stiffness.   Skin:  Negative for rash.   Neurological:  Positive for headaches. Negative for dizziness and light-headedness.       Objective {?Quick Links Trend Vitals * Enter New Vitals * Results Review * Timeline (Adult) * Labs * Imaging * Cardiology * Procedures * Lung Cancer Screening * Surgical eConsent :[id number]}  /70 (BP Location: Left arm, Patient Position: Sitting)   Pulse (!) 108   Temp 99.1 °F (37.3 °C) (Tympanic)   Ht 5' 2\" (1.575 m)   Wt 61.2 kg (135 lb)   LMP 02/20/2025 (Approximate)   SpO2 99%   Breastfeeding No   BMI 24.69 kg/m²      Physical Exam  Vitals and nursing note reviewed.   Constitutional:       General: She is not in acute distress.     Appearance: She is well-developed.   HENT:      Head: Normocephalic and atraumatic.      Right Ear: Tympanic membrane, ear canal and external ear normal.      Left Ear: Tympanic membrane, ear canal and external ear normal.      Nose: Congestion present.      Mouth/Throat:      Mouth: Mucous membranes are moist.      Pharynx: Oropharynx is clear. No oropharyngeal exudate or posterior oropharyngeal erythema.   Eyes:      Extraocular Movements: Extraocular movements intact.      Conjunctiva/sclera: Conjunctivae normal.      Pupils: Pupils are equal, round, and reactive to light.   Cardiovascular:      Rate and Rhythm: Normal rate and regular rhythm.      Heart sounds: No murmur heard.  Pulmonary:      Effort: Pulmonary effort is normal. No respiratory distress.      Breath " sounds: Normal breath sounds. No wheezing, rhonchi or rales.   Abdominal:      Palpations: Abdomen is soft.      Tenderness: There is no abdominal tenderness.   Musculoskeletal:         General: No swelling. Normal range of motion.      Cervical back: Normal range of motion and neck supple.   Lymphadenopathy:      Cervical: Cervical adenopathy present.   Skin:     General: Skin is warm and dry.      Capillary Refill: Capillary refill takes less than 2 seconds.   Neurological:      Mental Status: She is alert.   Psychiatric:         Mood and Affect: Mood normal.       Sunitha Pena PA-C

## 2025-03-18 DIAGNOSIS — A74.9 CHLAMYDIA INFECTION: Primary | ICD-10-CM

## 2025-03-18 RX ORDER — DOXYCYCLINE 100 MG/1
100 CAPSULE ORAL EVERY 12 HOURS SCHEDULED
Qty: 14 CAPSULE | Refills: 0 | Status: SHIPPED | OUTPATIENT
Start: 2025-03-18 | End: 2025-03-25

## 2025-04-29 ENCOUNTER — OFFICE VISIT (OUTPATIENT)
Dept: FAMILY MEDICINE CLINIC | Facility: CLINIC | Age: 20
End: 2025-04-29
Payer: COMMERCIAL

## 2025-04-29 VITALS
HEART RATE: 108 BPM | BODY MASS INDEX: 23 KG/M2 | OXYGEN SATURATION: 98 % | TEMPERATURE: 98.1 F | HEIGHT: 62 IN | DIASTOLIC BLOOD PRESSURE: 68 MMHG | SYSTOLIC BLOOD PRESSURE: 112 MMHG | WEIGHT: 125 LBS

## 2025-04-29 DIAGNOSIS — F17.200 VAPING NICOTINE DEPENDENCE, NON-TOBACCO PRODUCT: ICD-10-CM

## 2025-04-29 DIAGNOSIS — S90.212A CONTUSION OF LEFT GREAT TOE WITH DAMAGE TO NAIL, INITIAL ENCOUNTER: ICD-10-CM

## 2025-04-29 DIAGNOSIS — Z00.01 ENCOUNTER FOR WELL ADULT EXAM WITH ABNORMAL FINDINGS: Primary | ICD-10-CM

## 2025-04-29 DIAGNOSIS — R06.7 SNEEZING: ICD-10-CM

## 2025-04-29 DIAGNOSIS — Z13.220 SCREENING, LIPID: ICD-10-CM

## 2025-04-29 PROCEDURE — 99214 OFFICE O/P EST MOD 30 MIN: CPT | Performed by: FAMILY MEDICINE

## 2025-04-29 PROCEDURE — 99395 PREV VISIT EST AGE 18-39: CPT | Performed by: FAMILY MEDICINE

## 2025-04-29 NOTE — ASSESSMENT & PLAN NOTE
Advice and education were given regarding nutrition, aerobic exercises, weight-bearing exercises, cardiovascular risk reduction, fall risk reduction, and age-appropriate supplements.     The patient was counseled regarding instructions for management, risk factor reductions, prognosis, risks and benefits of treatment options, patient and family education, and importance of compliance with treatment.      MRI PENDING    Completion of MRI Screening Sheet    Fax to [phone] when completed    Please call [phone]  When this is done    Thank You

## 2025-04-29 NOTE — PATIENT INSTRUCTIONS
"Patient Education     Routine physical for adults   The Basics   Written by the doctors and editors at [facility]   What is a physical? -- A physical is a routine visit, or \"check-up,\" with your doctor. You might also hear it called a \"wellness visit\" or \"preventive visit.\"  During each visit, the doctor will:   Ask about your physical and mental health   Ask about your habits, behaviors, and lifestyle   Do an exam   Give you vaccines if needed   Talk to you about any medicines you take   Give advice about your health   Answer your questions  Getting regular check-ups is an important part of taking care of your health. It can help your doctor find and treat any problems you have. But it's also important for preventing health problems.  A routine physical is different from a \"sick visit.\" A sick visit is when you see a doctor because of a health concern or problem. Since physicals are scheduled ahead of time, you can think about what you want to ask the doctor.  How often should I get a physical? -- It depends on your age and health. In general, for people age 21 years and older:   If you are younger than 50 years, you might be able to get a physical every 3 years.   If you are 50 years or older, your doctor might recommend a physical every year.  If you have an ongoing health condition, like diabetes or high blood pressure, your doctor will probably want to see you more often.  What happens during a physical? -- In general, each visit will include:   Physical exam - The doctor or nurse will check your height, weight, heart rate, and blood pressure. They will also look at your eyes and ears. They will ask about how you are feeling and whether you have any symptoms that bother you.   Medicines - It's a good idea to bring a list of all the medicines you take to each doctor visit. Your doctor will talk to you about your medicines and answer any questions. Tell them if you are having any side effects that bother you. You " "should also tell them if you are having trouble paying for any of your medicines.   Habits and behaviors - This includes:   Your diet   Your exercise habits   Whether you smoke, drink alcohol, or use drugs   Whether you are sexually active   Whether you feel safe at home  Your doctor will talk to you about things you can do to improve your health and lower your risk of health problems. They will also offer help and support. For example, if you want to quit smoking, they can give you advice and might prescribe medicines. If you want to improve your diet or get more physical activity, they can help you with this, too.   Lab tests, if needed - The tests you get will depend on your age and situation. For example, your doctor might want to check your:   Cholesterol   Blood sugar   Iron level   Vaccines - The recommended vaccines will depend on your age, health, and what vaccines you already had. Vaccines are very important because they can prevent certain serious or deadly infections.   Discussion of screening - \"Screening\" means checking for diseases or other health problems before they cause symptoms. Your doctor can recommend screening based on your age, risk, and preferences. This might include tests to check for:   Cancer, such as breast, prostate, cervical, ovarian, colorectal, prostate, lung, or skin cancer   Sexually transmitted infections, such as chlamydia and gonorrhea   Mental health conditions like depression and anxiety  Your doctor will talk to you about the different types of screening tests. They can help you decide which screenings to have. They can also explain what the results might mean.   Answering questions - The physical is a good time to ask the doctor or nurse questions about your health. If needed, they can refer you to other doctors or specialists, too.  Adults older than 65 years often need other care, too. As you get older, your doctor will talk to you about:   How to prevent falling at " home   Hearing or vision tests   Memory testing   How to take your medicines safely   Making sure that you have the help and support you need at home  All topics are updated as new evidence becomes available and our peer review process is complete.  This topic retrieved from [employer] on: May 02, 2024.  Topic 916849 Version 1.0  Release: 32.4.3 - C32.122  © 2024 UpToDate, Inc. and/or its affiliates. All rights reserved.  Consumer Information Use and Disclaimer   Disclaimer: This generalized information is a limited summary of diagnosis, treatment, and/or medication information. It is not meant to be comprehensive and should be used as a tool to help the user understand and/or assess potential diagnostic and treatment options. It does NOT include all information about conditions, treatments, medications, side effects, or risks that may apply to a specific patient. It is not intended to be medical advice or a substitute for the medical advice, diagnosis, or treatment of a health care provider based on the health care provider's examination and assessment of a patient's specific and unique circumstances. Patients must speak with a health care provider for complete information about their health, medical questions, and treatment options, including any risks or benefits regarding use of medications. This information does not endorse any treatments or medications as safe, effective, or approved for treating a specific patient. UpToDate, Inc. and its affiliates disclaim any warranty or liability relating to this information or the use thereof.The use of this information is governed by the Terms of Use, available at https://www.woltersPoderopediauwer.com/en/know/clinical-effectiveness-terms. 2024© UpToDate, Inc. and its affiliates and/or licensors. All rights reserved.  Copyright   © 2024 UpToDate, Inc. and/or its affiliates. All rights reserved.

## 2025-04-29 NOTE — PROGRESS NOTES
Adult Annual Physical  Name: Radha Hahn      : 2005      MRN: [id number]  Encounter Provider: Irene Gonzalez MD  Encounter Date: 2025   Encounter department: [facility]    :  Assessment & Plan  Encounter for well adult exam with abnormal findings  Advice and education were given regarding nutrition, aerobic exercises, weight-bearing exercises, cardiovascular risk reduction, fall risk reduction, and age-appropriate supplements.     The patient was counseled regarding instructions for management, risk factor reductions, prognosis, risks and benefits of treatment options, patient and family education, and importance of compliance with treatment.       Vaping nicotine dependence, non-tobacco product     Aware of the complication of vaping not ready to quit yet  Sneezing  New diagnosis symptomatic recommend to use the antihistamine loratadine 10 mg once a dayRequest allergy test we did respiratory and food allergy  Orders:  •  [facility]ergy Panel, Adult; Future  •  Food Allergy Profile; Future    Contusion of left great toe with damage to nail, initial encounter  New diagnosis symptomatic we discussed with patient proper shoes wear proper footcare continue to monitor we offered to see podiatric she will hold on that for now       Screening, lipid    Orders:  •  Lipid panel; Future      Preventive Screenings:  - Diabetes Screening: screening up-to-date  - Cholesterol Screening: risks/benefits discussed   - Chlamydia Screening: screening up-to-date   - Hepatitis C screening: screening up-to-date   - HIV screening: screening up-to-date   - Cervical cancer screening: screening not indicated   - Colon cancer screening: screening not indicated   - Lung cancer screening: screening not indicated     Immunizations:  - Immunizations due: HPV (Gardasil 9)      Depression Screening and Follow-up Plan: Patient was screened for depression during today's encounter. They screened  negative with a PHQ-2 score of 0.      Tobacco Cessation Counseling: Tobacco cessation counseling was provided. The patient is sincerely urged to quit consumption of tobacco. She is not ready to quit tobacco.         History of Present Illness   Patient here for well exam and had concerned about the sneezing no short of breath no dyspnea on exertion but she noticed around this time of the year she get sneezing symptom also when she is around the cat and dog she get more itchy eyes and sneezing  Also patient concerned about her left big toe she bump it and she noticed at the base of the visit pain but no swelling no redness no discharge  Past medical surgical family and social history review    Adult Annual Physical:  Patient presents for annual physical.     Diet and Physical Activity:  - Diet/Nutrition: no special diet.  - Exercise: walking.    Depression Screening:  - PHQ-2 Score: 0    General Health:  - Sleep: 4-6 hours of sleep on average.  - Hearing: normal hearing bilateral ears.  - Vision: no vision problems.  - Dental: regular dental visits.    /GYN Health:  - Follows with GYN: yes.   - Contraception: oral contraceptives.      Review of Systems   Constitutional:  Negative for chills and fever.   HENT:  Positive for congestion, rhinorrhea and sneezing. Negative for ear pain and sore throat.    Eyes:  Negative for pain and visual disturbance.   Respiratory:  Negative for cough and shortness of breath.    Cardiovascular:  Negative for chest pain and palpitations.   Gastrointestinal:  Negative for abdominal pain, constipation, diarrhea and vomiting.   Genitourinary:  Negative for dysuria and hematuria.   Musculoskeletal:  Negative for arthralgias and back pain.   Skin:  Negative for color change and rash.   Neurological:  Negative for seizures and syncope.   All other systems reviewed and are negative.        Objective   /68 (BP Location: Left arm, Patient Position: Sitting, Cuff Size: Adult)   Pulse  "(!) 108   Temp 98.1 °F (36.7 °C)   Ht 5' 2\" (1.575 m)   Wt 56.7 kg (125 lb)   SpO2 98%   BMI 22.86 kg/m²     Physical Exam  Vitals and nursing note reviewed.   Constitutional:       General: She is not in acute distress.     Appearance: She is well-developed. She is not diaphoretic.   HENT:      Head: Normocephalic.      Right Ear: Tympanic membrane and external ear normal.      Left Ear: Tympanic membrane and external ear normal.      Nose: No rhinorrhea.      Mouth/Throat:      Pharynx: No posterior oropharyngeal erythema.   Eyes:      General:         Right eye: No discharge.         Left eye: No discharge.      Conjunctiva/sclera: Conjunctivae normal.   Neck:      Vascular: No JVD.   Cardiovascular:      Rate and Rhythm: Normal rate and regular rhythm.      Heart sounds: Normal heart sounds. No murmur heard.     No gallop.   Pulmonary:      Effort: Pulmonary effort is normal. No respiratory distress.      Breath sounds: Normal breath sounds. No wheezing.   Abdominal:      General: There is no distension.      Palpations: Abdomen is soft.      Tenderness: There is no abdominal tenderness. There is no rebound.   Musculoskeletal:         General: No tenderness.      Cervical back: Normal range of motion and neck supple.        Feet:    Lymphadenopathy:      Cervical: No cervical adenopathy.   Skin:     General: Skin is warm.      Findings: No rash.   Neurological:      Mental Status: She is alert and oriented to person, place, and time.         "

## 2025-05-01 PROBLEM — R06.7 SNEEZING: Status: ACTIVE | Noted: 2025-05-01

## 2025-05-01 PROBLEM — S90.212A CONTUSION OF LEFT GREAT TOE WITH DAMAGE TO NAIL: Status: ACTIVE | Noted: 2025-05-01

## 2025-05-01 NOTE — ASSESSMENT & PLAN NOTE
New diagnosis symptomatic recommend to use the antihistamine loratadine 10 mg once a dayRequest allergy test we did respiratory and food allergy  Orders:  •  [facility] Panel, Adult; Future  •  Food Allergy Profile; Future

## 2025-05-01 NOTE — ASSESSMENT & PLAN NOTE
New diagnosis symptomatic we discussed with patient proper shoes wear proper footcare continue to monitor we offered to see podiatric she will hold on that for now

## 2025-05-15 ENCOUNTER — TELEPHONE (OUTPATIENT)
Dept: GYNECOLOGY | Facility: CLINIC | Age: 20
End: 2025-05-15

## 2025-05-15 NOTE — TELEPHONE ENCOUNTER
Left mssg re:Missed appt.   Please give 24hr notice when possible.   Please C/B to R/S if appt still needed.

## 2025-05-27 ENCOUNTER — OFFICE VISIT (OUTPATIENT)
Dept: GYNECOLOGY | Facility: CLINIC | Age: 20
End: 2025-05-27
Payer: COMMERCIAL

## 2025-05-27 DIAGNOSIS — Z11.3 SCREENING FOR STD (SEXUALLY TRANSMITTED DISEASE): Primary | ICD-10-CM

## 2025-05-27 DIAGNOSIS — A74.9 CHLAMYDIA INFECTION: ICD-10-CM

## 2025-05-27 PROCEDURE — 99213 OFFICE O/P EST LOW 20 MIN: CPT | Performed by: OBSTETRICS & GYNECOLOGY

## 2025-05-27 PROCEDURE — 87591 N.GONORRHOEAE DNA AMP PROB: CPT | Performed by: OBSTETRICS & GYNECOLOGY

## 2025-05-27 PROCEDURE — 87491 CHLMYD TRACH DNA AMP PROBE: CPT | Performed by: OBSTETRICS & GYNECOLOGY

## 2025-05-27 NOTE — PROGRESS NOTES
Name: Radha Hahn      : 2005      MRN: [id number]  Encounter Provider: Airam Danielson DO  Encounter Date: 2025   Encounter department: [facility]  :  Assessment & Plan  Screening for STD (sexually transmitted disease)    Orders:    Chlamydia/GC amplified DNA by PCR    Chlamydia infection           History of chlamydia-repeat culture done today    Breakthrough bleeding on the patch-this has improved.  She will observe and if it worsens, she will call.    Return for yearly or as needed  History of Present Illness   HPI  Radha Hahn is a 19 y.o. female who presents for test of cure.  She had a positive chlamydia test in March.  She was treated with doxycycline.  It caused nausea but she was able to take the whole course.  Her partner was treated as well.  She is on the contraceptive patch and she starts bleeding a few days before her placebo week but her total amount of bleeding is about a week.  It is not heavy.      Review of Systems   Constitutional: Negative.    Gastrointestinal: Negative.    Genitourinary: Negative.           Objective   There were no vitals taken for this visit.     Physical Exam  Genitourinary:     General: Normal vulva.      Vagina: Normal.      Cervix: Normal.      Uterus: Normal.       Adnexa: Right adnexa normal and left adnexa normal.      Comments: Chlamydia and gonorrhea culture done without difficulty

## 2025-05-28 LAB
C TRACH DNA SPEC QL NAA+PROBE: NEGATIVE
N GONORRHOEA DNA SPEC QL NAA+PROBE: NEGATIVE

## 2025-05-29 ENCOUNTER — RESULTS FOLLOW-UP (OUTPATIENT)
Dept: GYNECOLOGY | Facility: CLINIC | Age: 20
End: 2025-05-29

## 2025-06-16 ENCOUNTER — APPOINTMENT (OUTPATIENT)
Dept: LAB | Facility: CLINIC | Age: 20
End: 2025-06-16
Attending: FAMILY MEDICINE
Payer: COMMERCIAL

## 2025-06-16 DIAGNOSIS — Z13.220 SCREENING, LIPID: ICD-10-CM

## 2025-06-16 DIAGNOSIS — R06.7 SNEEZING: ICD-10-CM

## 2025-06-16 LAB
CHOLEST SERPL-MCNC: 186 MG/DL (ref ?–200)
HDLC SERPL-MCNC: 73 MG/DL
LDLC SERPL CALC-MCNC: 98 MG/DL (ref 0–100)
NONHDLC SERPL-MCNC: 113 MG/DL
TRIGL SERPL-MCNC: 76 MG/DL (ref ?–150)

## 2025-06-16 PROCEDURE — 82785 ASSAY OF IGE: CPT

## 2025-06-16 PROCEDURE — 86003 ALLG SPEC IGE CRUDE XTRC EA: CPT

## 2025-06-16 PROCEDURE — 80061 LIPID PANEL: CPT

## 2025-06-16 PROCEDURE — 36415 COLL VENOUS BLD VENIPUNCTURE: CPT

## 2025-06-17 LAB
A ALTERNATA IGE QN: <0.1 KUA/I (ref 0–0.1)
A FUMIGATUS IGE QN: <0.1 KUA/I (ref 0–0.1)
ALMOND IGE QN: <0.1 KUA/I (ref 0–0.1)
BERMUDA GRASS IGE QN: <0.1 KUA/I (ref 0–0.1)
BOXELDER IGE QN: <0.1 KUA/I (ref 0–0.1)
C HERBARUM IGE QN: <0.1 KUA/I (ref 0–0.1)
CASHEW NUT IGE QN: <0.1 KUA/I (ref 0–0.1)
CAT DANDER IGE QN: <0.1 KUA/I (ref 0–0.1)
CMN PIGWEED IGE QN: <0.1 KUA/I (ref 0–0.1)
CODFISH IGE QN: <0.1 KUA/I (ref 0–0.1)
COMMON RAGWEED IGE QN: <0.1 KUA/I (ref 0–0.1)
COTTONWOOD IGE QN: <0.1 KUA/I (ref 0–0.1)
D FARINAE IGE QN: <0.1 KUA/I (ref 0–0.1)
D PTERONYSS IGE QN: <0.1 KUA/I (ref 0–0.1)
DOG DANDER IGE QN: <0.1 KUA/I (ref 0–0.1)
EGG WHITE IGE QN: <0.1 KUA/I (ref 0–0.1)
GLUTEN IGE QN: <0.1 KUA/I (ref 0–0.1)
HAZELNUT IGE QN: <0.1 KUA/L (ref 0–0.1)
LONDON PLANE IGE QN: <0.1 KUA/I (ref 0–0.1)
MILK IGE QN: <0.1 KUA/I (ref 0–0.1)
MOUSE URINE PROT IGE QN: <0.1 KUA/I (ref 0–0.1)
MT JUNIPER IGE QN: <0.1 KUA/I (ref 0–0.1)
MUGWORT IGE QN: <0.1 KUA/I (ref 0–0.1)
P NOTATUM IGE QN: <0.1 KUA/I (ref 0–0.1)
PEANUT IGE QN: <0.1 KUA/I (ref 0–0.1)
ROACH IGE QN: <0.1 KUA/I (ref 0–0.1)
SALMON IGE QN: <0.1 KUA/I (ref 0–0.1)
SCALLOP IGE QN: <0.1 KUA/L (ref 0–0.1)
SESAME SEED IGE QN: <0.1 KUA/I (ref 0–0.1)
SHEEP SORREL IGE QN: <0.1 KUA/I (ref 0–0.1)
SHRIMP IGE QN: <0.1 KUA/L (ref 0–0.1)
SILVER BIRCH IGE QN: <0.1 KUA/I (ref 0–0.1)
SOYBEAN IGE QN: <0.1 KUA/I (ref 0–0.1)
TIMOTHY IGE QN: <0.1 KUA/I (ref 0–0.1)
TOTAL IGE SMQN RAST: 11.9 KU/L (ref 0–113)
TOTAL IGE SMQN RAST: 12.5 KU/L (ref 0–113)
TUNA IGE QN: <0.1 KUA/I (ref 0–0.1)
WALNUT IGE QN: <0.1 KUA/I (ref 0–0.1)
WALNUT IGE QN: <0.1 KUA/I (ref 0–0.1)
WHEAT IGE QN: <0.1 KUA/I (ref 0–0.1)
WHITE ASH IGE QN: <0.1 KUA/I (ref 0–0.1)
WHITE ELM IGE QN: <0.1 KUA/I (ref 0–0.1)
WHITE MULBERRY IGE QN: <0.1 KUA/I (ref 0–0.1)
WHITE OAK IGE QN: <0.1 KUA/I (ref 0–0.1)

## 2025-07-24 ENCOUNTER — TELEPHONE (OUTPATIENT)
Dept: OTHER | Facility: HOSPITAL | Age: 20
End: 2025-07-24

## 2025-07-24 NOTE — TELEPHONE ENCOUNTER
Patient called requesting refill for estradiol (Xulane) 150-35 MCG . Patient made aware medication was refilled on 3/12/25 for 9 with 5 refills to [facility] pharmacy. Patient instructed to contact the pharmacy and speak with someone directly to obtain refills of medication. Patient advised to call back for refill if their pharmacy is unable to assist them. Patient verbalized understanding.      Patient noted one was unable to be used and she needs early.  Advised to inform [facility] so they can request insurance override

## (undated) DEVICE — COTTON TIP APPLICTOR 2 PK

## (undated) DEVICE — COBAN 4 IN STERILE

## (undated) DEVICE — POV-IOD SOLUTION 4OZ BT

## (undated) DEVICE — NEEDLE BLUNT 18 G X 1 1/2IN

## (undated) DEVICE — NEEDLE 25G X 1 1/2

## (undated) DEVICE — GAUZE SPONGES,16 PLY: Brand: CURITY

## (undated) DEVICE — SYRINGE 10ML LL CONTROL TOP

## (undated) DEVICE — STERILE POLYISOPRENE POWDER-FREE SURGICAL GLOVES: Brand: PROTEXIS